# Patient Record
Sex: FEMALE | Race: WHITE | NOT HISPANIC OR LATINO | Employment: FULL TIME | URBAN - NONMETROPOLITAN AREA
[De-identification: names, ages, dates, MRNs, and addresses within clinical notes are randomized per-mention and may not be internally consistent; named-entity substitution may affect disease eponyms.]

---

## 2020-03-07 ENCOUNTER — HOSPITAL ENCOUNTER (EMERGENCY)
Facility: HOSPITAL | Age: 25
Discharge: HOME | End: 2020-03-07
Attending: EMERGENCY MEDICINE | Admitting: EMERGENCY MEDICINE
Payer: COMMERCIAL

## 2020-03-07 VITALS
HEIGHT: 65 IN | DIASTOLIC BLOOD PRESSURE: 69 MMHG | SYSTOLIC BLOOD PRESSURE: 114 MMHG | BODY MASS INDEX: 29.83 KG/M2 | HEART RATE: 83 BPM | OXYGEN SATURATION: 98 % | RESPIRATION RATE: 16 BRPM | TEMPERATURE: 96.7 F | WEIGHT: 179.01 LBS

## 2020-03-07 DIAGNOSIS — R10.9 ABDOMINAL PAIN: Primary | ICD-10-CM

## 2020-03-07 LAB
ALBUMIN SERPL BCP-MCNC: 4 G/DL (ref 3.5–5)
ALP SERPL-CCNC: 73 U/L (ref 46–116)
ALT SERPL W P-5'-P-CCNC: 27 U/L (ref 12–78)
ANION GAP SERPL CALCULATED.3IONS-SCNC: 7 MMOL/L (ref 4–13)
AST SERPL W P-5'-P-CCNC: 17 U/L (ref 5–45)
B-HCG SERPL-ACNC: <2 MIU/ML
BACTERIA UR QL AUTO: ABNORMAL /HPF
BASOPHILS # BLD AUTO: 0.04 THOUSANDS/ΜL (ref 0–0.1)
BASOPHILS NFR BLD AUTO: 0 % (ref 0–1)
BILIRUB SERPL-MCNC: 0.3 MG/DL (ref 0.2–1)
BILIRUB UR QL STRIP: NEGATIVE
BUN SERPL-MCNC: 12 MG/DL (ref 5–25)
CALCIUM SERPL-MCNC: 8.9 MG/DL (ref 8.3–10.1)
CHLORIDE SERPL-SCNC: 101 MMOL/L (ref 100–108)
CLARITY UR: CLEAR
CO2 SERPL-SCNC: 28 MMOL/L (ref 21–32)
COLOR UR: YELLOW
CREAT SERPL-MCNC: 0.67 MG/DL (ref 0.6–1.3)
EOSINOPHIL # BLD AUTO: 0.04 THOUSAND/ΜL (ref 0–0.61)
EOSINOPHIL NFR BLD AUTO: 0 % (ref 0–6)
ERYTHROCYTE [DISTWIDTH] IN BLOOD BY AUTOMATED COUNT: 12.1 % (ref 11.6–15.1)
EXT PREG TEST URINE: NEGATIVE
EXT. CONTROL ED NAV: NORMAL
GFR SERPL CREATININE-BSD FRML MDRD: 123 ML/MIN/1.73SQ M
GLUCOSE SERPL-MCNC: 83 MG/DL (ref 65–140)
GLUCOSE UR STRIP-MCNC: NEGATIVE MG/DL
HCT VFR BLD AUTO: 41 % (ref 34.8–46.1)
HGB BLD-MCNC: 14 G/DL (ref 11.5–15.4)
HGB UR QL STRIP.AUTO: ABNORMAL
IMM GRANULOCYTES # BLD AUTO: 0.04 THOUSAND/UL (ref 0–0.2)
IMM GRANULOCYTES NFR BLD AUTO: 0 % (ref 0–2)
KETONES UR STRIP-MCNC: NEGATIVE MG/DL
LEUKOCYTE ESTERASE UR QL STRIP: NEGATIVE
LIPASE SERPL-CCNC: 120 U/L (ref 73–393)
LYMPHOCYTES # BLD AUTO: 3.22 THOUSANDS/ΜL (ref 0.6–4.47)
LYMPHOCYTES NFR BLD AUTO: 29 % (ref 14–44)
MCH RBC QN AUTO: 32.9 PG (ref 26.8–34.3)
MCHC RBC AUTO-ENTMCNC: 34.1 G/DL (ref 31.4–37.4)
MCV RBC AUTO: 97 FL (ref 82–98)
MONOCYTES # BLD AUTO: 0.75 THOUSAND/ΜL (ref 0.17–1.22)
MONOCYTES NFR BLD AUTO: 7 % (ref 4–12)
NEUTROPHILS # BLD AUTO: 6.91 THOUSANDS/ΜL (ref 1.85–7.62)
NEUTS SEG NFR BLD AUTO: 64 % (ref 43–75)
NITRITE UR QL STRIP: NEGATIVE
NON-SQ EPI CELLS URNS QL MICRO: ABNORMAL /HPF
NRBC BLD AUTO-RTO: 0 /100 WBCS
PH UR STRIP.AUTO: 7.5 [PH]
PLATELET # BLD AUTO: 335 THOUSANDS/UL (ref 149–390)
PMV BLD AUTO: 9 FL (ref 8.9–12.7)
POTASSIUM SERPL-SCNC: 3.4 MMOL/L (ref 3.5–5.3)
PROT SERPL-MCNC: 7.9 G/DL (ref 6.4–8.2)
PROT UR STRIP-MCNC: NEGATIVE MG/DL
RBC # BLD AUTO: 4.25 MILLION/UL (ref 3.81–5.12)
RBC #/AREA URNS AUTO: ABNORMAL /HPF
SODIUM SERPL-SCNC: 136 MMOL/L (ref 136–145)
SP GR UR STRIP.AUTO: 1.01 (ref 1–1.03)
UROBILINOGEN UR QL STRIP.AUTO: 0.2 E.U./DL
WBC # BLD AUTO: 11 THOUSAND/UL (ref 4.31–10.16)
WBC #/AREA URNS AUTO: ABNORMAL /HPF

## 2020-03-07 PROCEDURE — 81025 URINE PREGNANCY TEST: CPT | Performed by: EMERGENCY MEDICINE

## 2020-03-07 PROCEDURE — 99284 EMERGENCY DEPT VISIT MOD MDM: CPT

## 2020-03-07 PROCEDURE — 83690 ASSAY OF LIPASE: CPT | Performed by: EMERGENCY MEDICINE

## 2020-03-07 PROCEDURE — 96374 THER/PROPH/DIAG INJ IV PUSH: CPT

## 2020-03-07 PROCEDURE — 81001 URINALYSIS AUTO W/SCOPE: CPT | Performed by: EMERGENCY MEDICINE

## 2020-03-07 PROCEDURE — 87086 URINE CULTURE/COLONY COUNT: CPT | Performed by: EMERGENCY MEDICINE

## 2020-03-07 PROCEDURE — 96361 HYDRATE IV INFUSION ADD-ON: CPT

## 2020-03-07 PROCEDURE — 85025 COMPLETE CBC W/AUTO DIFF WBC: CPT | Performed by: EMERGENCY MEDICINE

## 2020-03-07 PROCEDURE — 80053 COMPREHEN METABOLIC PANEL: CPT | Performed by: EMERGENCY MEDICINE

## 2020-03-07 PROCEDURE — 99284 EMERGENCY DEPT VISIT MOD MDM: CPT | Performed by: EMERGENCY MEDICINE

## 2020-03-07 PROCEDURE — 87491 CHLMYD TRACH DNA AMP PROBE: CPT | Performed by: EMERGENCY MEDICINE

## 2020-03-07 PROCEDURE — 87591 N.GONORRHOEAE DNA AMP PROB: CPT | Performed by: EMERGENCY MEDICINE

## 2020-03-07 PROCEDURE — 84702 CHORIONIC GONADOTROPIN TEST: CPT

## 2020-03-07 RX ORDER — KETOROLAC TROMETHAMINE 30 MG/ML
15 INJECTION, SOLUTION INTRAMUSCULAR; INTRAVENOUS ONCE
Status: COMPLETED | OUTPATIENT
Start: 2020-03-07 | End: 2020-03-07

## 2020-03-07 RX ADMIN — KETOROLAC TROMETHAMINE 15 MG: 30 INJECTION, SOLUTION INTRAMUSCULAR at 18:00

## 2020-03-07 RX ADMIN — SODIUM CHLORIDE 1000 ML: 0.9 INJECTION, SOLUTION INTRAVENOUS at 17:55

## 2020-03-07 NOTE — ED PROVIDER NOTES
History  Chief Complaint   Patient presents with    Abdominal Pain     abdominal pain in lower abdomen into back in the kidney amairani  has a IUD concerned it maybe that, did have placement checked in january  Several hours of suprapubic discomfort  Radiates to the back  Cramping in nature  No vaginal bleeding no vaginal discharge  Some nausea no vomiting no fever no chills no dysuria hematuria frequency  Bowel habits have been slightly stringy but there is no diarrhea  Prior to Admission Medications   Prescriptions Last Dose Informant Patient Reported? Taking?   levonorgestrel (KYLEENA) 19 5 MG intrauterine device   Yes No   Si each by Intrauterine route      Facility-Administered Medications: None       Past Medical History:   Diagnosis Date    Asthma        Past Surgical History:   Procedure Laterality Date    TONSILLECTOMY         History reviewed  No pertinent family history  I have reviewed and agree with the history as documented  E-Cigarette/Vaping    E-Cigarette Use Former User     Quit Date 19      E-Cigarette/Vaping Substances    Nicotine No     THC No     CBD No     Flavoring No     Other No     Unknown No      Social History     Tobacco Use    Smoking status: Current Every Day Smoker     Packs/day: 0 25     Types: Cigarettes    Smokeless tobacco: Never Used   Substance Use Topics    Alcohol use: Yes     Comment: socially     Drug use: Never       Review of Systems   Constitutional: Negative for fever  HENT: Negative for rhinorrhea  Eyes: Negative for visual disturbance  Respiratory: Negative for shortness of breath  Cardiovascular: Negative for chest pain  Gastrointestinal: Positive for abdominal pain and nausea  Negative for diarrhea and vomiting  Endocrine: Negative for polydipsia  Genitourinary: Negative for dysuria, frequency and hematuria  Musculoskeletal: Negative for neck stiffness  Skin: Negative for rash     Allergic/Immunologic: Negative for immunocompromised state  Neurological: Negative for speech difficulty, weakness and numbness  Physical Exam  Physical Exam   Constitutional: She is oriented to person, place, and time  She appears well-nourished  No distress  HENT:   Head: Normocephalic and atraumatic  Eyes: Conjunctivae and EOM are normal    Neck: Normal range of motion  Neck supple  Cardiovascular: Regular rhythm and normal heart sounds  Pulmonary/Chest: Effort normal and breath sounds normal    Abdominal: Soft  Bowel sounds are normal  She exhibits no mass  There is no tenderness  There is no guarding  Musculoskeletal: She exhibits no edema  Neurological: She is alert and oriented to person, place, and time  Skin: Skin is warm and dry  Psychiatric: She has a normal mood and affect         Vital Signs  ED Triage Vitals [03/07/20 1650]   Temperature Pulse Respirations Blood Pressure SpO2   (!) 96 7 °F (35 9 °C) 75 18 149/61 99 %      Temp Source Heart Rate Source Patient Position - Orthostatic VS BP Location FiO2 (%)   Temporal Monitor Sitting Right arm --      Pain Score       5           Vitals:    03/07/20 1700 03/07/20 1715 03/07/20 1745 03/07/20 1815   BP: 149/61 114/57 101/74 114/69   Pulse: 67 94 84 83   Patient Position - Orthostatic VS: Sitting Sitting Lying Sitting         Visual Acuity      ED Medications  Medications   sodium chloride 0 9 % bolus 1,000 mL (0 mL Intravenous Stopped 3/7/20 1836)   ketorolac (TORADOL) injection 15 mg (15 mg Intravenous Given 3/7/20 1800)       Diagnostic Studies  Results Reviewed     Procedure Component Value Units Date/Time    UA w Reflex to Microscopic w Reflex to Culture [[id number]]  (Abnormal) Collected:  03/07/20 1839    Lab Status:  Final result Specimen:  Urine, Clean Catch Updated:  03/07/20 1848     Color, UA Yellow     Clarity, UA Clear     Specific Gravity, UA 1 010     pH, UA 7 5     Leukocytes, UA Negative     Nitrite, UA Negative     Protein, UA Negative mg/dl      Glucose, UA Negative mg/dl      Ketones, UA Negative mg/dl      Urobilinogen, UA 0 2 E U /dl      Bilirubin, UA Negative     Blood, UA Small    Urine Microscopic [[id number]] Collected:  03/07/20 1839    Lab Status: In process Specimen:  Urine, Clean Catch Updated:  03/07/20 1848    Chlamydia/GC amplified DNA by PCR [[id number]] Collected:  03/07/20 1839    Lab Status: In process Specimen:  Urine, Other Updated:  03/07/20 1843    Urine culture [[id number]] Collected:  03/07/20 1839    Lab Status:   In process Specimen:  Urine, Clean Catch Updated:  03/07/20 1843    POCT pregnancy, urine [[id number]]  (Normal) Resulted:  03/07/20 1835    Lab Status:  Final result Updated:  03/07/20 1836     EXT PREG TEST UR (Ref: Negative) negative     Control valid    hCG, quantitative [[id number]]  (Normal) Collected:  03/07/20 1755    Lab Status:  Final result Specimen:  Blood from Arm, Left Updated:  03/07/20 1821     HCG, Quant <2 mIU/mL     Narrative:        Expected Ranges:     Approximate               Approximate HCG  Gestation age          Concentration ( mIU/mL)  _____________          ______________________   Jose Breen                      HCG values  0 2-1                       5-50  1-2                           2-3                         100-5000  3-4                         500-78469  4-5                         1000-76132  5-6                         [id number]  6-8                         40020-346964  8-12                        27108-096856      Comprehensive metabolic panel [[id number]]  (Abnormal) Collected:  03/07/20 1755    Lab Status:  Final result Specimen:  Blood from Arm, Left Updated:  03/07/20 1817     Sodium 136 mmol/L      Potassium 3 4 mmol/L      Chloride 101 mmol/L      CO2 28 mmol/L      ANION GAP 7 mmol/L      BUN 12 mg/dL      Creatinine 0 67 mg/dL      Glucose 83 mg/dL      Calcium 8 9 mg/dL      AST 17 U/L      ALT 27 U/L      Alkaline Phosphatase 73 U/L      Total Protein 7 9 g/dL Albumin 4 0 g/dL      Total Bilirubin 0 30 mg/dL      eGFR 123 ml/min/1 73sq m     Narrative:       National Kid[employer] Foundation guidelines for Chronic Kidney Disease (CKD):     Stage 1 with normal or high GFR (GFR > 90 mL/min/1 73 square meters)    Stage 2 Mild CKD (GFR = 60-89 mL/min/1 73 square meters)    Stage 3A Moderate CKD (GFR = 45-59 mL/min/1 73 square meters)    Stage 3B Moderate CKD (GFR = 30-44 mL/min/1 73 square meters)    Stage 4 Severe CKD (GFR = 15-29 mL/min/1 73 square meters)    Stage 5 End Stage CKD (GFR <15 mL/min/1 73 square meters)  Note: GFR calculation is accurate only with a steady state creatinine    Lipase [[id number]]  (Normal) Collected:  03/07/20 1755    Lab Status:  Final result Specimen:  Blood from Arm, Left Updated:  03/07/20 1810     Lipase 120 u/L     CBC and differential [[id number]]  (Abnormal) Collected:  03/07/20 1755    Lab Status:  Final result Specimen:  Blood from Arm, Left Updated:  03/07/20 1801     WBC 11 00 Thousand/uL      RBC 4 25 Million/uL      Hemoglobin 14 0 g/dL      Hematocrit 41 0 %      MCV 97 fL      MCH 32 9 pg      MCHC 34 1 g/dL      RDW 12 1 %      MPV 9 0 fL      Platelets 428 Thousands/uL      nRBC 0 /100 WBCs      Neutrophils Relative 64 %      Immat GRANS % 0 %      Lymphocytes Relative 29 %      Monocytes Relative 7 %      Eosinophils Relative 0 %      Basophils Relative 0 %      Neutrophils Absolute 6 91 Thousands/µL      Immature Grans Absolute 0 04 Thousand/uL      Lymphocytes Absolute 3 22 Thousands/µL      Monocytes Absolute 0 75 Thousand/µL      Eosinophils Absolute 0 04 Thousand/µL      Basophils Absolute 0 04 Thousands/µL                  No orders to display              Procedures  Procedures         ED Course                               MDM  Number of Diagnoses or Management Options  Abdominal pain:   Diagnosis management comments: Suprapubic discomfort benign exam will check urine pregnancy test give IV fluids and re-evaluate rather than go directly to imaging      1857: pt says pain has almost completely resolved  abdo completely nontender  Discharging but gave precautions to return for any recurrent/worse/new symp  Disposition  Final diagnoses:   Abdominal pain     Time reflects when diagnosis was documented in both MDM as applicable and the Disposition within this note     Time User Action Codes Description Comment    3/7/2020  6:56 PM Yaya Clancy Add [R10 9] Abdominal pain       ED Disposition     ED Disposition Condition Date/Time Comment    Discharge Stable Sat Mar 7, 2020  6:56 PM Derik Morrell discharge to home/self care  Follow-up Information     Follow up With Specialties Details Why Contact Info    Primary Care Provider - see in next 48 hours  Go in 2 days As needed, If symptoms worsen or new symptoms develop           Patient's Medications   Discharge Prescriptions    No medications on file     No discharge procedures on file      PDMP Review     None          ED Provider  Electronically Signed by           Anitha Mendoza MD  03/07/20 8302

## 2020-03-09 LAB
BACTERIA UR CULT: NORMAL
C TRACH DNA SPEC QL NAA+PROBE: NEGATIVE
N GONORRHOEA DNA SPEC QL NAA+PROBE: NEGATIVE

## 2020-07-28 ENCOUNTER — OFFICE VISIT (OUTPATIENT)
Dept: URGENT CARE | Facility: CLINIC | Age: 25
End: 2020-07-28
Payer: COMMERCIAL

## 2020-07-28 VITALS
HEIGHT: 65 IN | TEMPERATURE: 97.8 F | WEIGHT: 177 LBS | BODY MASS INDEX: 29.49 KG/M2 | HEART RATE: 66 BPM | SYSTOLIC BLOOD PRESSURE: 108 MMHG | OXYGEN SATURATION: 97 % | RESPIRATION RATE: 16 BRPM | DIASTOLIC BLOOD PRESSURE: 51 MMHG

## 2020-07-28 DIAGNOSIS — R19.8 ABNORMAL BOWEL HABITS: Primary | ICD-10-CM

## 2020-07-28 PROCEDURE — G0381 LEV 2 HOSP TYPE B ED VISIT: HCPCS | Performed by: PHYSICIAN ASSISTANT

## 2020-07-28 PROCEDURE — 99202 OFFICE O/P NEW SF 15 MIN: CPT | Performed by: PHYSICIAN ASSISTANT

## 2020-07-28 PROCEDURE — 99282 EMERGENCY DEPT VISIT SF MDM: CPT | Performed by: PHYSICIAN ASSISTANT

## 2020-07-28 NOTE — PROGRESS NOTES
[facility]        NAME: Talat Tidwell is a 25 y o  female  : 1995    MRN: [id number]  DATE: 2020  TIME: 3:43 PM    Assessment and Plan   Abnormal bowel habits [R19 8]  1  Abnormal bowel habits           Patient Instructions       Follow up with PCP in 3-5 days  Proceed to  ER if symptoms worsen  Chief Complaint     Chief Complaint   Patient presents with    worms in stool     c/o gray/white worms in stool first noticed approx one week ago, boyfriend had same  History of Present Illness       Patient states she believes she seen worms in her stool  Friends she lives with have similar sxs  Denies abd pain, diarrhea, blood in stool  Does have occasional anal pruritis  Review of Systems   Review of Systems   Constitutional: Negative for chills and fever  Gastrointestinal: Negative for abdominal distention, abdominal pain, anal bleeding, blood in stool, constipation, diarrhea, nausea, rectal pain and vomiting  Current Medications       Current Outpatient Medications:     levonorgestrel (KYLEENA) 19 5 MG intrauterine device, 1 each by Intrauterine route, Disp: , Rfl:     Current Allergies     Allergies as of 2020    (No Known Allergies)            The following portions of the patient's history were reviewed and updated as appropriate: allergies, current medications, past family history, past medical history, past social history, past surgical history and problem list      Past Medical History:   Diagnosis Date    Asthma        Past Surgical History:   Procedure Laterality Date    TONSILLECTOMY         History reviewed  No pertinent family history  Medications have been verified          Objective   /51 (BP Location: Left arm, Patient Position: Sitting, Cuff Size: Large)   Pulse 66   Temp 97 8 °F (36 6 °C) (Temporal)   Resp 16   Ht 5' 5" (1 651 m)   Wt 80 3 kg (177 lb)   SpO2 97%   BMI 29 45 kg/m²        Physical Exam     Physical Exam Constitutional: She appears well-developed and well-nourished  HENT:   Head: Normocephalic and atraumatic  Cardiovascular: Normal rate and regular rhythm  Pulmonary/Chest: Effort normal    Neurological: She is alert  Skin: Skin is warm  Psychiatric: She has a normal mood and affect  Nursing note and vitals reviewed

## 2020-08-31 ENCOUNTER — HOSPITAL ENCOUNTER (EMERGENCY)
Facility: HOSPITAL | Age: 25
Discharge: HOME | End: 2020-09-01
Attending: EMERGENCY MEDICINE | Admitting: EMERGENCY MEDICINE
Payer: COMMERCIAL

## 2020-08-31 VITALS
HEART RATE: 107 BPM | SYSTOLIC BLOOD PRESSURE: 138 MMHG | DIASTOLIC BLOOD PRESSURE: 70 MMHG | HEIGHT: 65 IN | TEMPERATURE: 97.8 F | OXYGEN SATURATION: 99 % | WEIGHT: 177.69 LBS | BODY MASS INDEX: 29.61 KG/M2 | RESPIRATION RATE: 16 BRPM

## 2020-08-31 DIAGNOSIS — S61.211A LACERATION OF LEFT INDEX FINGER WITHOUT FOREIGN BODY WITHOUT DAMAGE TO NAIL, INITIAL ENCOUNTER: Primary | ICD-10-CM

## 2020-08-31 PROCEDURE — 90715 TDAP VACCINE 7 YRS/> IM: CPT

## 2020-08-31 PROCEDURE — 99282 EMERGENCY DEPT VISIT SF MDM: CPT

## 2020-08-31 PROCEDURE — 90471 IMMUNIZATION ADMIN: CPT

## 2020-08-31 PROCEDURE — 12001 RPR S/N/AX/GEN/TRNK 2.5CM/<: CPT | Performed by: EMERGENCY MEDICINE

## 2020-08-31 PROCEDURE — 99282 EMERGENCY DEPT VISIT SF MDM: CPT | Performed by: EMERGENCY MEDICINE

## 2020-08-31 RX ORDER — LIDOCAINE HYDROCHLORIDE 10 MG/ML
5 INJECTION, SOLUTION EPIDURAL; INFILTRATION; INTRACAUDAL; PERINEURAL ONCE
Status: DISCONTINUED | OUTPATIENT
Start: 2020-08-31 | End: 2020-08-31

## 2020-08-31 RX ADMIN — TETANUS TOXOID, REDUCED DIPHTHERIA TOXOID AND ACELLULAR PERTUSSIS VACCINE, ADSORBED 0.5 ML: 5; 2.5; 8; 8; 2.5 SUSPENSION INTRAMUSCULAR at 23:22

## 2020-09-01 NOTE — ED PROVIDER NOTES
History  Chief Complaint   Patient presents with    Finger Laceration     cut left index finger on a kitchen knife approximatly 20 minutes ago  27-year-old right-hand-dominant female presenting with laceration to her left index finger  Patient reports that she was attempting to open a package with a knife when she slipped and cut the tip of her left index finger  Bleeding is mostly controlled  No other complaints on review of systems  Patient is not aware of her last tetanus immunization  Prior to Admission Medications   Prescriptions Last Dose Informant Patient Reported? Taking?   levonorgestrel (KYLEENA) 19 5 MG intrauterine device   Yes No   Si each by Intrauterine route      Facility-Administered Medications: None       Past Medical History:   Diagnosis Date    Asthma        Past Surgical History:   Procedure Laterality Date    TONSILLECTOMY         History reviewed  No pertinent family history  I have reviewed and agree with the history as documented  E-Cigarette/Vaping    E-Cigarette Use Former User     Quit Date 19      E-Cigarette/Vaping Substances    Nicotine No     THC No     CBD No     Flavoring No     Other No     Unknown No      Social History     Tobacco Use    Smoking status: Current Every Day Smoker     Packs/day: 0 50     Types: Cigarettes    Smokeless tobacco: Never Used   Substance Use Topics    Alcohol use: Yes     Comment: socially     Drug use: Never       Review of Systems   Constitutional: Negative for diaphoresis, fever and unexpected weight change  HENT: Negative for congestion, rhinorrhea and sore throat  Eyes: Negative for pain, discharge and visual disturbance  Respiratory: Negative for cough, shortness of breath and wheezing  Cardiovascular: Negative for chest pain, palpitations and leg swelling  Gastrointestinal: Negative for abdominal pain, blood in stool, constipation, diarrhea, nausea and vomiting     Genitourinary: Negative for dysuria, flank pain and hematuria  Musculoskeletal: Negative for arthralgias and joint swelling  Skin: Positive for wound (laceration, left index finger)  Negative for rash  Allergic/Immunologic: Negative for environmental allergies and food allergies  Neurological: Negative for dizziness, seizures, weakness and numbness  Hematological: Negative for adenopathy  Psychiatric/Behavioral: Negative for confusion and hallucinations  Physical Exam  Physical Exam  Vitals signs and nursing note reviewed  Constitutional:       General: She is not in acute distress  Appearance: She is well-developed  HENT:      Head: Normocephalic and atraumatic  Right Ear: External ear normal       Left Ear: External ear normal    Eyes:      Conjunctiva/sclera: Conjunctivae normal       Pupils: Pupils are equal, round, and reactive to light  Musculoskeletal: Normal range of motion  General: No deformity  Skin:     General: Skin is warm and dry  Comments: Laceration to tip left index finger  Laceration is relatively superficial with a small, relatively thin flap of skin  Bleeding controlled  Neurological:      Mental Status: She is alert and oriented to person, place, and time  Comments: No gross motor deficits noted  Cranial nerves II-XII are intact  Speech is fluent without dysarthria or aphasia     Psychiatric:         Mood and Affect: Mood normal          Behavior: Behavior normal          Vital Signs  ED Triage Vitals [08/31/20 2313]   Temperature Pulse Respirations Blood Pressure SpO2   97 8 °F (36 6 °C) (!) 107 16 138/70 99 %      Temp Source Heart Rate Source Patient Position - Orthostatic VS BP Location FiO2 (%)   Temporal Monitor Sitting Right arm --      Pain Score       --           Vitals:    08/31/20 2313   BP: 138/70   Pulse: (!) 107   Patient Position - Orthostatic VS: Sitting         Visual Acuity      ED Medications  Medications   tetanus-diphtheria-acellular pertussis (BOOSTRIX) IM injection 0 5 mL (0 5 mL Intramuscular Given 8/31/20 3594)       Diagnostic Studies  Results Reviewed     None                 No orders to display              Procedures  Laceration repair    Date/Time: 9/1/2020 12:08 AM  Performed by: Ajay Rolon MD  Authorized by: Ajay Rolon MD   Consent: Verbal consent obtained  Risks and benefits: risks, benefits and alternatives were discussed  Consent given by: patient  Patient understanding: patient states understanding of the procedure being performed  Patient consent: the patient's understanding of the procedure matches consent given  Patient identity confirmed: verbally with patient and arm band  Body area: upper extremity  Location details: left index finger  Laceration length: 0 5 cm  Tendon involvement: none  Nerve involvement: none  Vascular damage: no      Procedure Details:  Preparation: Patient was prepped and draped in the usual sterile fashion  Irrigation solution: saline  Irrigation method: jet lavage  Amount of cleaning: standard  Skin closure: Steri-Strips and glue  Technique: simple  Approximation: close  Approximation difficulty: simple  Patient tolerance: Patient tolerated the procedure well with no immediate complications               ED Course       US AUDIT      Most Recent Value   Initial Alcohol Screen: US AUDIT-C    1  How often do you have a drink containing alcohol? 1 Filed at: 08/31/2020 2314   2  How many drinks containing alcohol do you have on a typical day you are drinking? 1 Filed at: 08/31/2020 2314   3a  Male UNDER 65: How often do you have five or more drinks on one occasion? 0 Filed at: 08/31/2020 2314   3b  FEMALE Any Age, or MALE 65+: How often do you have 4 or more drinks on one occassion? 0 Filed at: 08/31/2020 2314   Audit-C Score  2 Filed at: 08/31/2020 2314                  CARMINE/DAST-10      Most Recent Value   How many times in the past year have you       Used an illegal drug or used a prescription medication for non-medical reasons? Never Filed at: 08/31/2020 2314                                MDM  Number of Diagnoses or Management Options  Laceration of left index finger without foreign body without damage to nail, initial encounter: minor  Diagnosis management comments:     Patient presented with a laceration to her left index finger as detailed above  Laceration was relatively superficial   There was a small flap of skin  This laceration would not be amenable to suture repair  Laceration was repaired with Steri-Strips and glue  Patient tolerated procedure well  Tetanus was updated  Patient was given wound care instructions  She was advised to follow up with her PCP as needed  Patient verbalized understanding  Amount and/or Complexity of Data Reviewed  Decide to obtain previous medical records or to obtain history from someone other than the patient: yes  Review and summarize past medical records: yes    Risk of Complications, Morbidity, and/or Mortality  Presenting problems: minimal  Diagnostic procedures: minimal  Management options: minimal    Patient Progress  Patient progress: stable        Disposition  Final diagnoses:   Laceration of left index finger without foreign body without damage to nail, initial encounter     Time reflects when diagnosis was documented in both MDM as applicable and the Disposition within this note     Time User Action Codes Description Comment    8/31/2020 11:47 PM Chantale Nelson Add [[id number]] Laceration of left index finger without foreign body without damage to nail, initial encounter       ED Disposition     ED Disposition Condition Date/Time Comment    Discharge Good Mon Aug 31, 2020 11:47 PM Johnny Palafox discharge to home/self care  Follow-up Information     Follow up With Specialties Details Why Contact Info Additional Information    Dorothy Duran Nurse Practitioner Call  As needed   [address]  [place] [address]  [phone]       [facility], [place], operated by [facility] Emergency Department Emergency Medicine Go to  If symptoms worsen  [address] [id number] [phone] [place] ED, [facility] [address], [place], [place], [address]          Discharge Medication List as of 9/1/2020 12:05 AM      CONTINUE these medications which have NOT CHANGED    Details   levonorgestrel (Alem Holiday) 19 5 MG intrauterine device 1 each by Intrauterine route, Historical Med           No discharge procedures on file      PDMP Review     None          ED Provider  Electronically Signed by           Renetta Herman MD  09/01/20 9989

## 2020-09-01 NOTE — DISCHARGE INSTRUCTIONS
Your laceration was repaired with Steri-Strips and skin glue  The skin glue will dissolve on its own and the Steri-Strips will fall off on their own  You may shower as normal       Return to the ER with redness spreading up the finger into the hand, unexplained fever or chills, pus draining from the laceration, finger swelling, if the wound comes apart,  or any other concerning symptoms

## 2020-12-09 ENCOUNTER — APPOINTMENT (OUTPATIENT)
Dept: RADIOLOGY | Facility: CLINIC | Age: 25
End: 2020-12-09
Payer: COMMERCIAL

## 2020-12-09 ENCOUNTER — OFFICE VISIT (OUTPATIENT)
Dept: INTERNAL MEDICINE CLINIC | Facility: CLINIC | Age: 25
End: 2020-12-09
Payer: COMMERCIAL

## 2020-12-09 ENCOUNTER — LAB (OUTPATIENT)
Dept: LAB | Facility: CLINIC | Age: 25
End: 2020-12-09
Payer: COMMERCIAL

## 2020-12-09 ENCOUNTER — TELEPHONE (OUTPATIENT)
Dept: ADMINISTRATIVE | Facility: OTHER | Age: 25
End: 2020-12-09

## 2020-12-09 VITALS
RESPIRATION RATE: 14 BRPM | HEART RATE: 83 BPM | OXYGEN SATURATION: 99 % | DIASTOLIC BLOOD PRESSURE: 66 MMHG | HEIGHT: 66 IN | TEMPERATURE: 97 F | BODY MASS INDEX: 28.67 KG/M2 | WEIGHT: 178.4 LBS | SYSTOLIC BLOOD PRESSURE: 112 MMHG

## 2020-12-09 DIAGNOSIS — M54.50 CHRONIC MIDLINE LOW BACK PAIN WITHOUT SCIATICA: Primary | ICD-10-CM

## 2020-12-09 DIAGNOSIS — G89.29 CHRONIC MIDLINE LOW BACK PAIN WITHOUT SCIATICA: Primary | ICD-10-CM

## 2020-12-09 DIAGNOSIS — R19.7 DIARRHEA, UNSPECIFIED TYPE: ICD-10-CM

## 2020-12-09 DIAGNOSIS — M54.50 CHRONIC MIDLINE LOW BACK PAIN WITHOUT SCIATICA: ICD-10-CM

## 2020-12-09 DIAGNOSIS — R10.13 DYSPEPSIA: ICD-10-CM

## 2020-12-09 DIAGNOSIS — G89.29 CHRONIC MIDLINE LOW BACK PAIN WITHOUT SCIATICA: ICD-10-CM

## 2020-12-09 LAB
ALBUMIN SERPL BCP-MCNC: 3.9 G/DL (ref 3.5–5)
ALP SERPL-CCNC: 67 U/L (ref 46–116)
ALT SERPL W P-5'-P-CCNC: 23 U/L (ref 12–78)
ANION GAP SERPL CALCULATED.3IONS-SCNC: 3 MMOL/L (ref 4–13)
AST SERPL W P-5'-P-CCNC: 11 U/L (ref 5–45)
BASOPHILS # BLD AUTO: 0.04 THOUSANDS/ΜL (ref 0–0.1)
BASOPHILS NFR BLD AUTO: 0 % (ref 0–1)
BILIRUB SERPL-MCNC: 0.77 MG/DL (ref 0.2–1)
BUN SERPL-MCNC: 10 MG/DL (ref 5–25)
CALCIUM SERPL-MCNC: 9.1 MG/DL (ref 8.3–10.1)
CHLORIDE SERPL-SCNC: 107 MMOL/L (ref 100–108)
CO2 SERPL-SCNC: 28 MMOL/L (ref 21–32)
CREAT SERPL-MCNC: 0.62 MG/DL (ref 0.6–1.3)
CRP SERPL QL: <3 MG/L
EOSINOPHIL # BLD AUTO: 0.05 THOUSAND/ΜL (ref 0–0.61)
EOSINOPHIL NFR BLD AUTO: 1 % (ref 0–6)
ERYTHROCYTE [DISTWIDTH] IN BLOOD BY AUTOMATED COUNT: 12.1 % (ref 11.6–15.1)
GFR SERPL CREATININE-BSD FRML MDRD: 126 ML/MIN/1.73SQ M
GLUCOSE P FAST SERPL-MCNC: 93 MG/DL (ref 65–99)
HCT VFR BLD AUTO: 40.5 % (ref 34.8–46.1)
HGB BLD-MCNC: 13.6 G/DL (ref 11.5–15.4)
IMM GRANULOCYTES # BLD AUTO: 0.02 THOUSAND/UL (ref 0–0.2)
IMM GRANULOCYTES NFR BLD AUTO: 0 % (ref 0–2)
LYMPHOCYTES # BLD AUTO: 3.58 THOUSANDS/ΜL (ref 0.6–4.47)
LYMPHOCYTES NFR BLD AUTO: 36 % (ref 14–44)
MCH RBC QN AUTO: 32.3 PG (ref 26.8–34.3)
MCHC RBC AUTO-ENTMCNC: 33.6 G/DL (ref 31.4–37.4)
MCV RBC AUTO: 96 FL (ref 82–98)
MONOCYTES # BLD AUTO: 0.73 THOUSAND/ΜL (ref 0.17–1.22)
MONOCYTES NFR BLD AUTO: 7 % (ref 4–12)
NEUTROPHILS # BLD AUTO: 5.61 THOUSANDS/ΜL (ref 1.85–7.62)
NEUTS SEG NFR BLD AUTO: 56 % (ref 43–75)
NRBC BLD AUTO-RTO: 0 /100 WBCS
PLATELET # BLD AUTO: 343 THOUSANDS/UL (ref 149–390)
PMV BLD AUTO: 9.5 FL (ref 8.9–12.7)
POTASSIUM SERPL-SCNC: 3.6 MMOL/L (ref 3.5–5.3)
PROT SERPL-MCNC: 7.6 G/DL (ref 6.4–8.2)
RBC # BLD AUTO: 4.21 MILLION/UL (ref 3.81–5.12)
SODIUM SERPL-SCNC: 138 MMOL/L (ref 136–145)
WBC # BLD AUTO: 10.03 THOUSAND/UL (ref 4.31–10.16)

## 2020-12-09 PROCEDURE — 72110 X-RAY EXAM L-2 SPINE 4/>VWS: CPT

## 2020-12-09 PROCEDURE — 3008F BODY MASS INDEX DOCD: CPT | Performed by: INTERNAL MEDICINE

## 2020-12-09 PROCEDURE — 36415 COLL VENOUS BLD VENIPUNCTURE: CPT

## 2020-12-09 PROCEDURE — 86140 C-REACTIVE PROTEIN: CPT

## 2020-12-09 PROCEDURE — 3725F SCREEN DEPRESSION PERFORMED: CPT | Performed by: INTERNAL MEDICINE

## 2020-12-09 PROCEDURE — 99204 OFFICE O/P NEW MOD 45 MIN: CPT | Performed by: INTERNAL MEDICINE

## 2020-12-09 PROCEDURE — 85025 COMPLETE CBC W/AUTO DIFF WBC: CPT

## 2020-12-09 PROCEDURE — 80053 COMPREHEN METABOLIC PANEL: CPT

## 2020-12-09 RX ORDER — OMEPRAZOLE 40 MG/1
40 CAPSULE, DELAYED RELEASE ORAL
Qty: 30 CAPSULE | Refills: 1 | Status: SHIPPED | OUTPATIENT
Start: 2020-12-09

## 2020-12-09 RX ORDER — MELOXICAM 15 MG/1
15 TABLET ORAL DAILY PRN
Qty: 15 TABLET | Refills: 0 | Status: SHIPPED | OUTPATIENT
Start: 2020-12-09 | End: 2020-12-24

## 2021-01-24 ENCOUNTER — TELEPHONE (OUTPATIENT)
Dept: OTHER | Facility: OTHER | Age: 26
End: 2021-01-24

## 2021-01-25 ENCOUNTER — HOSPITAL ENCOUNTER (EMERGENCY)
Facility: HOSPITAL | Age: 26
Discharge: HOME | End: 2021-01-25
Attending: EMERGENCY MEDICINE | Admitting: EMERGENCY MEDICINE
Payer: OTHER MISCELLANEOUS

## 2021-01-25 ENCOUNTER — APPOINTMENT (EMERGENCY)
Dept: CT IMAGING | Facility: HOSPITAL | Age: 26
End: 2021-01-25
Payer: OTHER MISCELLANEOUS

## 2021-01-25 VITALS
OXYGEN SATURATION: 98 % | TEMPERATURE: 97.7 F | BODY MASS INDEX: 28.79 KG/M2 | DIASTOLIC BLOOD PRESSURE: 67 MMHG | SYSTOLIC BLOOD PRESSURE: 117 MMHG | HEART RATE: 102 BPM | WEIGHT: 178.35 LBS | RESPIRATION RATE: 20 BRPM

## 2021-01-25 DIAGNOSIS — S09.90XA INJURY OF HEAD, INITIAL ENCOUNTER: ICD-10-CM
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
## 2021-01-25 LAB
EXT PREG TEST URINE: NEGATIVE
EXT. CONTROL ED NAV: NORMAL

## 2021-01-25 PROCEDURE — 72125 CT NECK SPINE W/O DYE: CPT

## 2021-01-25 PROCEDURE — 99284 EMERGENCY DEPT VISIT MOD MDM: CPT

## 2021-01-25 PROCEDURE — 81025 URINE PREGNANCY TEST: CPT | Performed by: EMERGENCY MEDICINE

## 2021-01-25 PROCEDURE — 99284 EMERGENCY DEPT VISIT MOD MDM: CPT | Performed by: EMERGENCY MEDICINE

## 2021-01-25 PROCEDURE — 70450 CT HEAD/BRAIN W/O DYE: CPT

## 2021-01-25 RX ORDER — ACETAMINOPHEN 325 MG/1
650 TABLET ORAL ONCE
Status: COMPLETED | OUTPATIENT
Start: 2021-01-25 | End: 2021-01-25

## 2021-01-25 RX ADMIN — ACETAMINOPHEN 650 MG: 325 TABLET, FILM COATED ORAL at 21:34

## 2021-01-26 NOTE — ED PROVIDER NOTES
History  Chief Complaint   Patient presents with    Fall     HEAD INJURY     49-year-old female presents with left occipital head strike  Patient was at work and slipped falling backwards striking her head  She denies loss consciousness  She states this occurred at 1900 hours      History provided by:  Patient  Fall  Mechanism of injury: fall    Injury location:  Head/neck  Head/neck injury location:  Head  Incident location:  Work  Fall:     Fall occurred: fall  Impact surface: Tiles at work  Point of impact:  Head  Prior to arrival data:     Bystander interventions:  None    Blood loss:  None    Airway interventions:  None    Breathing interventions:  None    IV access status:  None    IO access:  None    Fluids administered:  None    Cardiac interventions:  None  Associated symptoms: no abdominal pain, no chest pain and no headaches        Prior to Admission Medications   Prescriptions Last Dose Informant Patient Reported? Taking?   levonorgestrel (KYLEENA) 19 5 MG intrauterine device   Yes No   Si each by Intrauterine route   meloxicam (MOBIC) 15 mg tablet   No No   Sig: Take 1 tablet (15 mg total) by mouth daily as needed (Low back pain) for up to 15 days   omeprazole (PriLOSEC) 40 MG capsule   No No   Sig: Take 1 capsule (40 mg total) by mouth daily before breakfast      Facility-Administered Medications: None       Past Medical History:   Diagnosis Date    Asthma        Past Surgical History:   Procedure Laterality Date    TONSILLECTOMY      WISDOM TOOTH EXTRACTION         Family History   Problem Relation Age of Onset    ADD / ADHD Mother     ADD / ADHD Father     Cancer Family      I have reviewed and agree with the history as documented      E-Cigarette/Vaping    E-Cigarette Use Former User     Quit Date 19      E-Cigarette/Vaping Substances    Nicotine Yes     THC No     CBD No     Flavoring Yes     Other No     Unknown No      Social History     Tobacco Use    Smoking status: Current Every Day Smoker     Packs/day: 0 50     Types: Cigarettes    Smokeless tobacco: Never Used   Substance Use Topics    Alcohol use: Yes     Frequency: Monthly or less     Comment: socially     Drug use: Never       Review of Systems   Constitutional: Negative  Negative for activity change, appetite change and chills  HENT: Negative  Negative for congestion, dental problem and drooling  Eyes: Negative  Negative for pain, discharge and itching  Respiratory: Negative  Negative for apnea, choking and chest tightness  Cardiovascular: Negative  Negative for chest pain and leg swelling  Gastrointestinal: Negative  Negative for abdominal distention, abdominal pain and anal bleeding  Endocrine: Negative  Negative for cold intolerance, heat intolerance and polydipsia  Genitourinary: Negative  Negative for difficulty urinating and dyspareunia  Musculoskeletal: Negative  Negative for arthralgias  Skin: Negative  Negative for color change, pallor and rash  Allergic/Immunologic: Negative  Negative for environmental allergies and food allergies  Neurological: Negative  Negative for dizziness, facial asymmetry and headaches  Hematological: Negative  Negative for adenopathy  Psychiatric/Behavioral: Negative  Negative for agitation, behavioral problems and confusion  All other systems reviewed and are negative  Physical Exam  Physical Exam  Vitals signs reviewed  Constitutional:       General: She is not in acute distress  HENT:      Head: Normocephalic  Right Ear: External ear normal       Left Ear: External ear normal       Nose: Nose normal       Mouth/Throat:      Mouth: Mucous membranes are moist       Pharynx: No oropharyngeal exudate  Eyes:      General:         Right eye: No discharge  Left eye: No discharge  Pupils: Pupils are equal, round, and reactive to light  Neck:      Musculoskeletal: Normal range of motion     Cardiovascular: Rate and Rhythm: Normal rate  Pulses: Normal pulses  Heart sounds: No murmur  Pulmonary:      Effort: Pulmonary effort is normal  No respiratory distress  Abdominal:      General: Abdomen is flat  There is no distension  Palpations: There is no mass  Musculoskeletal: Normal range of motion  General: No swelling  Skin:     General: Skin is warm  Capillary Refill: Capillary refill takes less than 2 seconds  Neurological:      General: No focal deficit present  Mental Status: She is alert  Psychiatric:         Mood and Affect: Mood normal          Thought Content: Thought content normal          Vital Signs  ED Triage Vitals [01/25/21 2120]   Temperature Pulse Respirations Blood Pressure SpO2   97 7 °F (36 5 °C) 102 20 117/67 100 %      Temp Source Heart Rate Source Patient Position - Orthostatic VS BP Location FiO2 (%)   Oral Monitor Sitting Right arm --      Pain Score       7           Vitals:    01/25/21 2120   BP: 117/67   Pulse: 102   Patient Position - Orthostatic VS: Sitting         Visual Acuity  Visual Acuity      Most Recent Value   L Pupil Size (mm)  4   R Pupil Size (mm)  4          ED Medications  Medications   acetaminophen (TYLENOL) tablet 650 mg (has no administration in time range)       Diagnostic Studies  Results Reviewed     Procedure Component Value Units Date/Time    POCT pregnancy, urine [[id number]]     Lab Status: No result                  CT head without contrast    (Results Pending)   CT cervical spine without contrast    (Results Pending)              Procedures  Procedures         ED Course                                           MDM  Number of Diagnoses or Management Options  Diagnosis management comments: Differential diagnosis 1  Intracranial hemorrhage 2  Cervical spine fracture 3   Concussion       Disposition  Final diagnoses:   Fall, initial encounter   Injury of head, initial encounter     Time reflects when diagnosis was documented in both MDM as applicable and the Disposition within this note     Time User Action Codes Description Comment    1/25/2021  9:34 PM Kenisha Membreno [[id number]  [id number]] Fall, initial encounter     1/25/2021  9:34 PM Kenisha Membreno [[id number]] Injury of head, initial encounter       ED Disposition     ED Disposition Condition Date/Time Comment    Discharge Stable Mon Jan 25, 2021  9:34 PM [employer] [employer] discharge to home/self care  Follow-up Information    None         Patient's Medications   Discharge Prescriptions    No medications on file     No discharge procedures on file      PDMP Review     None          ED Provider  Electronically Signed by           Chelsey Bass DO  01/27/21 9233

## 2021-06-12 ENCOUNTER — AMB VIDEO VISIT (OUTPATIENT)
Dept: OTHER | Facility: HOSPITAL | Age: 26
End: 2021-06-12

## 2021-06-12 NOTE — CARE ANYWHERE EVISITS
Visit Summary for Darrel Corral - Gender: Female - Date of Birth: [id number]  Date: [id number] - Duration: 9 minutes  Patient: Darrel Corral  Provider: Gallito Marquez    Patient Contact Information  Address  [address] [place]; [address]  [phone]    Visit Topics  Frequent nosebleeds, mild headaches, passing nausea, fatigue [Added By: Self - 2021-06-12]    Triage Questions   What is your current physical address in the event of a medical emergency? Answer []  Are you allergic to any medications? Answer []  Are you now or could you be pregnant? Answer []  Do you have any immune system compromise or chronic lung   disease? Answer []  Do you have any vulnerable family members in the home (infant, pregnant, cancer, elderly)? Answer []     Conversation Transcripts  [0A][0A] [Notification] Elly Hudson, [employer] Staff, will help you prepare for your visit  She is Tayla Hedrick, Family Physician [0A][Michel Perez] Arash, and thank you for connecting  While you are waiting for the doctor, are there   any questions I can answer for you about our service? Please contact customer service if you have questions about billing, insurance, or technical issues  Visits work best with a stable WiFi connection, so please make sure you are connected before we   begin [0A][Notification] Elly Hudson has left the room  [0A][Notification] You are connected with Stevenson Hedrick, Family Physician [0A][Notification] Johnny Palafox is located in [place]  [0A][Notification] Johnny Palafox has shared health   history  Margarito Becerra  [0A]    Diagnosis  Epistaxis    Procedures  Value: [id number] Code: CPT-4 UNLISTED E&M SERVICE    Medications Prescribed    No prescriptions ordered    Provider Notes  [0A][0A] Reason for Consultation: [0A]Patient presents with complaint of  recurrent nosebleeds  Patientâs nose is no longer[0A]bleeding  She denies trauma or HTN    [0A]PMH:  none[0A]Meds:  none[0A]Medication Allergies: NKDA[0A]Exam: Alert, normal   mental[0A]status and interaction, no visible distress, non- toxic appearance  [0A]Assessment:  Epistaxis[0A]Plan:  No longer[0A]bleeding  Reassurance  Use a[0A]small amount of Vaseline to moisturize the nose for a few days  [0A]Use a humidifier in the   home or bedroom with the door closed  Use saline nasal spray for moisture  See an ENT  The above[0A]treatment is necessary per standard of care[0A]Discussed  precautions  [0A]Patient voiced understanding and agrees to plan  [0A]Follow up:[0A]1)       If[0A]you were prescribed medication and there are any questions or problems with the[0A]prescription, call [phone] anytime for assistance  Namrata Powers)     Please[0A]re-connect for another online visit or see an in-person provider should   your[0A]symptoms worsen or persist or see your primary care physician   Papo Ayala)    Please[0A]print a copy of this note and send it to your regular doctor, or take it to[0A]your next visit so it may be included in your medical record   [0A]    Electronically signed by: Modesta Hadley(NPI [id number])

## 2021-07-14 ENCOUNTER — OFFICE VISIT (OUTPATIENT)
Dept: INTERNAL MEDICINE CLINIC | Facility: CLINIC | Age: 26
End: 2021-07-14
Payer: COMMERCIAL

## 2021-07-14 ENCOUNTER — APPOINTMENT (OUTPATIENT)
Dept: LAB | Facility: CLINIC | Age: 26
End: 2021-07-14
Payer: COMMERCIAL

## 2021-07-14 VITALS
HEART RATE: 93 BPM | BODY MASS INDEX: 29.01 KG/M2 | TEMPERATURE: 98.4 F | WEIGHT: 180.5 LBS | OXYGEN SATURATION: 98 % | DIASTOLIC BLOOD PRESSURE: 70 MMHG | SYSTOLIC BLOOD PRESSURE: 108 MMHG | HEIGHT: 66 IN

## 2021-07-14 DIAGNOSIS — R11.0 NAUSEA: ICD-10-CM

## 2021-07-14 DIAGNOSIS — N64.4 BREAST TENDERNESS: ICD-10-CM

## 2021-07-14 DIAGNOSIS — N91.2 AMENORRHEA: Primary | ICD-10-CM

## 2021-07-14 DIAGNOSIS — Z32.01 POSITIVE PREGNANCY TEST: ICD-10-CM

## 2021-07-14 DIAGNOSIS — R60.9 FLUID RETENTION: ICD-10-CM

## 2021-07-14 DIAGNOSIS — N91.2 AMENORRHEA: ICD-10-CM

## 2021-07-14 LAB — B-HCG SERPL-ACNC: ABNORMAL MIU/ML

## 2021-07-14 PROCEDURE — 3008F BODY MASS INDEX DOCD: CPT | Performed by: INTERNAL MEDICINE

## 2021-07-14 PROCEDURE — 1036F TOBACCO NON-USER: CPT | Performed by: INTERNAL MEDICINE

## 2021-07-14 PROCEDURE — 99213 OFFICE O/P EST LOW 20 MIN: CPT | Performed by: INTERNAL MEDICINE

## 2021-07-14 PROCEDURE — 36415 COLL VENOUS BLD VENIPUNCTURE: CPT

## 2021-07-14 PROCEDURE — 3725F SCREEN DEPRESSION PERFORMED: CPT | Performed by: INTERNAL MEDICINE

## 2021-07-14 PROCEDURE — 84702 CHORIONIC GONADOTROPIN TEST: CPT

## 2021-07-14 NOTE — PROGRESS NOTES
Assessment/Plan:  Problem List Items Addressed This Visit     None      Visit Diagnoses     Amenorrhea    -  Primary    Relevant Orders    hCG, quantitative    Ambulatory referral to Obstetrics / Gynecology    Positive pregnancy test        Relevant Orders    hCG, quantitative    Ambulatory referral to Obstetrics / Gynecology    Nausea        Relevant Orders    hCG, quantitative    Ambulatory referral to Obstetrics / Gynecology    Fluid retention        Relevant Orders    hCG, quantitative    Ambulatory referral to Obstetrics / Gynecology    Breast tenderness               Diagnoses and all orders for this visit:    Amenorrhea  -     hCG, quantitative; Future  -     Ambulatory referral to Obstetrics / Gynecology; Future    Positive pregnancy test  -     hCG, quantitative; Future  -     Ambulatory referral to Obstetrics / Gynecology; Future    Nausea  -     hCG, quantitative; Future  -     Ambulatory referral to Obstetrics / Gynecology; Future    Fluid retention  -     hCG, quantitative; Future  -     Ambulatory referral to Obstetrics / Gynecology; Future    Breast tenderness        No problem-specific Assessment & Plan notes found for this encounter  A/P: Doing ok and will check a quantitative HCG  Refer to GYN  Hold on prenatal vitamin until labs back  Discussed watching diet and getting sleep  Discussed avoiding smoking, ETOH, drugs(OTC as well), etc  RTC as scheduled  Emotionally appears to be dealing with the situation well  Subjective:      Patient ID: Armida Sadler is a 22 y o  female  WF, never been pregnant, presents for several months missed  Menses  Pt was on an IUD and had it taken out around 2/21  Has since has unprotected sex and has missed her menses over the past 2-3 months  Recently noted some nausea, fluid retention, and increase in abdominal girth  Breast tenderness as well  Took two home pregnancy test and both were positive  Plans on keeping the child if pregnant  Denies any depression  The following portions of the patient's history were reviewed and updated as appropriate:   She has a past medical history of Asthma ,  does not have a problem list on file  ,   has a past surgical history that includes Tonsillectomy and Tram tooth extraction  ,  family history includes ADD / ADHD in her father and mother; Cancer in her family  ,   reports that she quit smoking 12 days ago  Her smoking use included cigarettes  She smoked 0 50 packs per day  She has never used smokeless tobacco  She reports previous alcohol use  She reports that she does not use drugs  ,  has No Known Allergies     Current Outpatient Medications   Medication Sig Dispense Refill    levonorgestrel (KYLEENA) 19 5 MG intrauterine device 1 each by Intrauterine route (Patient not taking: Reported on 7/14/2021)      meloxicam (MOBIC) 15 mg tablet Take 1 tablet (15 mg total) by mouth daily as needed (Low back pain) for up to 15 days 15 tablet 0    omeprazole (PriLOSEC) 40 MG capsule Take 1 capsule (40 mg total) by mouth daily before breakfast (Patient not taking: Reported on 7/14/2021) 30 capsule 1     No current facility-administered medications for this visit  Review of Systems   Constitutional: Negative for activity change, chills, diaphoresis, fatigue and fever  Respiratory: Negative for cough, chest tightness, shortness of breath and wheezing  Cardiovascular: Negative for chest pain, palpitations and leg swelling  Gastrointestinal: Positive for nausea  Negative for abdominal pain, constipation, diarrhea and vomiting  Genitourinary: Negative for difficulty urinating, dysuria and frequency  Musculoskeletal: Negative for arthralgias, gait problem and myalgias  Breast tenderness  Neurological: Negative for dizziness, seizures, syncope, weakness, light-headedness and headaches  Psychiatric/Behavioral: Negative for confusion  The patient is not nervous/anxious          PHQ-9 Depression Screening    PHQ-9: Frequency of the following problems over the past two weeks:      Little interest or pleasure in doing things: 1 - several days  Feeling down, depressed, or hopeless: 1 - several days  PHQ-2 Score: 2        Objective:  Vitals:    07/14/21 1422   BP: 108/70   Pulse: 93   Temp: 98 4 °F (36 9 °C)   SpO2: 98%   Weight: 81 9 kg (180 lb 8 oz)   Height: 5' 6" (1 676 m)     Body mass index is 29 13 kg/m²       Physical Exam

## 2022-12-12 ENCOUNTER — VBI (OUTPATIENT)
Dept: ADMINISTRATIVE | Facility: OTHER | Age: 27
End: 2022-12-12

## 2023-06-07 ENCOUNTER — VBI (OUTPATIENT)
Dept: ADMINISTRATIVE | Facility: OTHER | Age: 28
End: 2023-06-07

## 2024-07-28 ENCOUNTER — OFFICE VISIT (OUTPATIENT)
Dept: URGENT CARE | Facility: CLINIC | Age: 29
End: 2024-07-28
Payer: COMMERCIAL

## 2024-07-28 VITALS
HEART RATE: 93 BPM | RESPIRATION RATE: 18 BRPM | DIASTOLIC BLOOD PRESSURE: 60 MMHG | TEMPERATURE: 98.2 F | OXYGEN SATURATION: 99 % | SYSTOLIC BLOOD PRESSURE: 128 MMHG

## 2024-07-28 DIAGNOSIS — N76.0 ACUTE VAGINITIS: Primary | ICD-10-CM

## 2024-07-28 PROCEDURE — 99213 OFFICE O/P EST LOW 20 MIN: CPT

## 2024-07-28 RX ORDER — CLOTRIMAZOLE AND BETAMETHASONE DIPROPIONATE 10; .64 MG/G; MG/G
CREAM TOPICAL 2 TIMES DAILY
Qty: 45 G | Refills: 0 | Status: SHIPPED | OUTPATIENT
Start: 2024-07-28

## 2024-07-28 NOTE — PATIENT INSTRUCTIONS
"Patient Education     Vaginitis in adults   The Basics   Written by the doctors and editors at [facility]   What is vaginitis? -- Vaginitis is when the vagina and vulva become red and swollen (figure 1). (The vulva is the area around the opening of the vagina.) It is sometimes called \"vulvovaginitis.\"  Many different things can cause vaginitis. These include:   Infection - There are 3 main infections that can cause vaginitis in adults. These are bacterial vaginosis, \"Candida\" or \"yeast \"infection, and trichomoniasis. Other types of infections can also cause vaginal discharge and irritation. These include some sexually transmitted infections (\"STIs\").   Skin irritation - This can be caused by soaps, bubble bath, douches, detergents, perfumes, toilet paper, or sanitary pads.   Hormone changes - For example, these can be related to puberty, pregnancy, or menopause.   Certain medicines - Examples include antibiotics or medicines that weaken the immune system.   Certain health problems - Some medical conditions increase the risk of infection. These include uncontrolled diabetes or HIV infection.  What are the symptoms of vaginitis? -- Symptoms of vaginitis include:   A change in color, odor, or amount of vaginal discharge   Itching or irritation in or around the vagina   Redness, swelling, or cracks in the skin around the vagina   Pain during sex  Should I see a doctor or nurse? -- Yes. If you have the symptoms listed above, see a doctor or nurse.  If possible, avoid using medicines that go in your vagina for a day or 2 before your appointment. If you have recently used vaginal medicine, it can be harder for the doctor or nurse to find the cause of abnormal discharge.  Will I need tests? -- Probably. First, your doctor or nurse will ask questions and do an exam. They will often do tests to check for infection.  If tests are needed, they might include:   pH and microscopy - The doctor or nurse looks at a sample of vaginal " "discharge under a microscope and checks the \"pH level.\" This is done in the doctor's office.   Lab tests - The doctor or nurse collects a sample of vaginal discharge. Then, the sample is sent to a lab for testing. These tests can show if you have an infection and, if so, what type.   STI tests - These include tests to check for gonorrhea, chlamydia, and trichomoniasis.  Some pH test kits are sold over the counter for use at home. But these are not recommended. That's because they only check the pH and cannot show the cause of infection.  How is vaginitis treated? -- Treatment depends on what is causing the vaginitis. It might include:   Medicines - Medicines are mainly used to treat vaginitis caused by infections. These include pills that you take by mouth, creams or gels that you put in your vagina, or \"suppositories.\" Suppositories are tablets that are inserted into the vagina to dissolve.   Avoiding causes of irritation - For example, if the vaginitis was caused by using bubble bath, avoiding bubble bath will help.  Can vaginitis be prevented? -- To lower your chances of getting vaginitis, you can:   Get out of wet clothing quickly - Change out of wet swimsuits or gym clothes as soon as you can. Avoid fabrics that hold moisture, like nylon or polyester.   Wear loose-fitting clothing - Tight clothing, such as pantyhose, can trap moisture and make infection more likely.   Clean the area around the vagina with water - Rinse the area with water only. If you must use soap, choose a mild soap and rinse well. Do not use bubble bath or a douche. Do not use perfume or other fragrance sprays around the vagina.   Practice good genital hygiene - Wipe from front to back after using the toilet, and urinate after you have sex.   Use condoms during sex - This can lower your chances of getting bacterial vaginosis or an STI.  What problems should I watch for? -- If you have been diagnosed with vaginitis, call for advice if:   Your " symptoms are not getting better or are getting worse after treatment.   Your vaginitis comes back after getting better.  All topics are updated as new evidence becomes available and our peer review process is complete.  This topic retrieved from [employer] on: Mar 29, 2024.  Topic 868712 Version 3.0  Release: 32.2.4 - C32.87  © 2024 UpToDate, Inc. and/or its affiliates. All rights reserved.  figure 1: Adult female external genitalia     This drawing shows the different parts of the genitals.  Graphic 56415 Version 9.0  Consumer Information Use and Disclaimer   Disclaimer: This generalized information is a limited summary of diagnosis, treatment, and/or medication information. It is not meant to be comprehensive and should be used as a tool to help the user understand and/or assess potential diagnostic and treatment options. It does NOT include all information about conditions, treatments, medications, side effects, or risks that may apply to a specific patient. It is not intended to be medical advice or a substitute for the medical advice, diagnosis, or treatment of a health care provider based on the health care provider's examination and assessment of a patient's specific and unique circumstances. Patients must speak with a health care provider for complete information about their health, medical questions, and treatment options, including any risks or benefits regarding use of medications. This information does not endorse any treatments or medications as safe, effective, or approved for treating a specific patient. UpToDate, Inc. and its affiliates disclaim any warranty or liability relating to this information or the use thereof.The use of this information is governed by the Terms of Use, available at https://www.wolterskluwer.com/en/know/clinical-effectiveness-terms. 2024© UpToDate, Inc. and its affiliates and/or licensors. All rights reserved.  Copyright   © 2024 Up[employer]Date, Inc. and/or its affiliates. All rights  reserved.

## 2024-07-28 NOTE — PROGRESS NOTES
"  [facility]        NAME: Dedra Corral is a 28 y.o. female  : 1995    MRN: [id number]  DATE: 2024  TIME: 8:43 AM    Assessment and Plan   Acute vaginitis [N76.0]  1. Acute vaginitis  clotrimazole-betamethasone (LOTRISONE) 1-0.05 % cream        Chaperone present for exam.  The skin of the inner groins does appear slightly red and irritated.  There is no obvious abscess or increased warmth of the skin.  There may be a small possible fissure/excoriation anteriorly of the mid perineum.  However, patient denies any pain with bowel movements.  Bowel movements have been normal lately.  Does have some burning in the area, especially when urine touches the surrounding skin.  There has been no abnormal vaginal discharge or bleeding per patient.  Will start on clotrimazole-betamethasone cream.  Recommended to call OB/GYN tomorrow to schedule sooner follow-up visit.    Patient Instructions     Use prescribed medication as instructed.  Tylenol/ibuprofen for pain or fever.  Make sure area is dry.  Call OB/GYN tomorrow to schedule sooner follow-up visit if possible.  Stool softener, warm sitz bath's, increasing fiber in your diet.  Follow up with PCP in 3-5 days.  Proceed to  ER if symptoms worsen.    If tests are performed, our office will contact you with results only if changes need to made to the care plan discussed with you at the visit. [facility]    Chief Complaint     Chief Complaint   Patient presents with    Vaginal Swelling     Started two days ago with vaginal and perineal swelling that is painful.  No recent trauma but did have intercourse one week ago.  No concern for STD as she uses protection. Appears to be \"bleeding\" but denies active bleeding on tissue.  Urinating without difficulty.  BM yesterday.  No pain with bowel or bladder.  Also starts it burns the area with urination.  Does have OB/GYN on           History of Present Illness " [facility]  Complex Care Plan  About Me  Patient Name:  Maddy Ortiz    YOB: 1984  Age:     34 year old   [facility] MRN:   [id number] Telephone Information:  Home Phone [phone]   Mobile [phone]       Address:    [address] [place] [id number] Email address:  No e-mail address on record      Emergency Contact(s)  Name Relationship Lgl Grd Work Phone Home Phone Mobile Phone   1. RAMY ORTIZ Mother No  [phone]    2. KASHIF ORTIZ Father No  [phone]    3. KASHIF DIAZ Friend No none [phone]           Primary language:  English     needed? No   [facility]:  [phone] op. 1  Other communication barriers: Lack of coping  Preferred Method of Communication:  Mail  Current living arrangement: I live in a private home with family(with 10 y.o dtre, Dtr currenlty staying with grandparents CPS involved.)  Mobility Status/ Medical Equipment: Independent    Health Maintenance  Health Maintenance Reviewed:      My Access Plan  Medical Emergency 911   Primary Clinic Line [facility] - [phone]   24 Hour Appointment Line [phone] or  [phone] ([phone]) (toll-free)   24 Hour Nurse Line [phone] (toll-free)   Preferred Urgent Care [facility], [phone]   Preferred Hospital [facility]  [phone]   Preferred Pharmacy [facility] [place], [place] - [address] [place] [address]     Behavioral Health Crisis Line The National Suicide Prevention Lifeline at 1-809.406.3716 or 911     My Care Team Members  Care Team       Relationship Specialty Notifications Start End    Shirley Freeman APRN CNP PCP - General Nurse Practitioner  5/24/18     Phone: [phone] Fax: [phone] [address] [facility] [address]    Shirley Freeman APRN CNP PCP - Assigned PCP   6/17/18     Phone: [phone]  "      28-year-old female presents the clinic for 2 days of vaginal and perineal swelling and pain.  Denies recent trauma.  Patient states she does use protection with intercourse, recently had sexual intercourse 1 week ago.  Patient denies difficulty urinating.  Admits to some burning in the area when she does urinate.  Normal bowel movement yesterday.  No pain with defecation.  Denies concern for STDs.  Denies vaginal discharge/bleeding.  Denies fever, chills, chest pain, shortness of breath abdominal pain, flank pain.        Review of Systems   Review of Systems   Constitutional: Negative.    Respiratory: Negative.     Cardiovascular: Negative.    Gastrointestinal: Negative.    Genitourinary:         \"Vaginal swelling\"   Neurological: Negative.          Current Medications       Current Outpatient Medications:     clotrimazole-betamethasone (LOTRISONE) 1-0.05 % cream, Apply topically 2 (two) times a day, Disp: 45 g, Rfl: 0    levonorgestrel (KYLEENA) 19.5 MG intrauterine device, 1 each by Intrauterine route (Patient not taking: Reported on 7/14/2021), Disp: , Rfl:     meloxicam (MOBIC) 15 mg tablet, Take 1 tablet (15 mg total) by mouth daily as needed (Low back pain) for up to 15 days, Disp: 15 tablet, Rfl: 0    omeprazole (PriLOSEC) 40 MG capsule, Take 1 capsule (40 mg total) by mouth daily before breakfast (Patient not taking: Reported on 7/14/2021), Disp: 30 capsule, Rfl: 1    Current Allergies     Allergies as of 07/28/2024    (No Known Allergies)            The following portions of the patient's history were reviewed and updated as appropriate: allergies, current medications, past family history, past medical history, past social history, past surgical history and problem list.     Past Medical History:   Diagnosis Date    Asthma        Past Surgical History:   Procedure Laterality Date    TONSILLECTOMY      WISDOM TOOTH EXTRACTION         Family History   Problem Relation Age of Onset    ADD / ADHD Mother  " Fax: [phone] [address] [facility] [address]    Cayla Winchester Psychologist Psychology  11/21/13     [facility]- TIMOTHY signed 11/20/13-   Email- [email or web address]    Phone: [phone] Fax: [phone]         [facility] [address] [facility] [address]    Callie Welsh [employer] worker   11/21/13     Temi PONCE    11/21/13     [facility]  (TIMOTHY signed 11/20/13)    Phone: [phone]         Wallace Calabrese MD MD Psychiatry  11/21/13     [facility] [place] (TIMOTHY signed 11/20/13)    Phone: [phone] Fax: [phone]         [facility] [address] [facility] [address]    Deni Fernandez MD MD Family Practice  5/4/18     Phone: [phone] Fax: [phone] [address] [facility] [address]    Aggie Lehman MD MD INTERNAL MEDICINE - ENDOCRINOLOGY, DIABETES & METABOLISM  5/4/18     Phone: [phone] Fax: [phone]         [address] [facility] [address] [facility] [address]    Padmini Beatty LSW Lead Care Coordinator Primary Care - [facility]  8/30/18     (Rosemary STARKS     Phone: [phone] Fax: [phone]                My Care Plans    My Care Plans  Self Management and Treatment Plan  Goals and (Comments)  Goals        General    Medication 1 (pt-stated)     Notes - Note created  8/30/2018  4:26 PM by Padmini Beatty LSW    Goal Statement: I need to get my medication.  Measure of Success: will have medication  Supportive Steps to Achieve: Verified MA is active  Barriers:   Strengths: pt called ins, will go to pharmacy to get meds  Date to Achieve By: 9/1/18  Patient expressed understanding of goal: fair        Mental Health Management (pt-stated)     Notes - Note created  8/31/2018  9:12 AM by Padmini Beatty LSW    Goal Statement: I will follow up with my therapist and Psychiatrist  Measure of Success: will keep scheduled appts  Supportive     ADD / ADHD Father     Cancer Family          Medications have been verified.        Objective   /60 (BP Location: Right arm)   Pulse 93   Temp 98.2 °F (36.8 °C) (Skin)   Resp 18   SpO2 99%        Physical Exam     Physical Exam  Exam conducted with a chaperone present.   Constitutional:       General: She is not in acute distress.     Appearance: Normal appearance. She is not ill-appearing, toxic-appearing or diaphoretic.   HENT:      Head: Normocephalic and atraumatic.   Cardiovascular:      Rate and Rhythm: Normal rate and regular rhythm.      Pulses: Normal pulses.      Heart sounds: Normal heart sounds.   Pulmonary:      Effort: Pulmonary effort is normal. No respiratory distress.      Breath sounds: Normal breath sounds.   Abdominal:      General: Abdomen is flat. Bowel sounds are normal. There is no distension.      Palpations: Abdomen is soft.      Tenderness: There is no abdominal tenderness. There is no right CVA tenderness, left CVA tenderness, guarding or rebound.      Hernia: There is no hernia in the left inguinal area or right inguinal area.   Genitourinary:     General: Normal vulva.      Pubic Area: No rash.       Labia:         Right: No rash, tenderness, lesion or injury.         Left: No rash, tenderness, lesion or injury.       Urethra: No prolapse, urethral pain, urethral swelling or urethral lesion.          Comments: Some mild erythema and irritation of the skin to the inner groin region just laterally to the labia majora.  There is a small fissure/excoriation at the mid perineum region.  Mild tenderness.  This is the area the patient complains of some burning to the skin.  No obvious fluctuance/abscess.  Labia appear normal without tenderness or abscess.  No obvious hemorrhoids.  There is no obvious vaginal swelling.  Lymphadenopathy:      Lower Body: No right inguinal adenopathy. No left inguinal adenopathy.   Skin:     General: Skin is warm and dry.      Capillary Refill:  Steps to Achieve: has appts  Barriers:   Strengths: has established supports   Date to Achieve By:Sept 15th.   Patient expressed understanding of goal: yes               Action Plans on File:         Depression  Advance Care Plans/Directives Type:        My Medical and Care Information  Problem List   Patient Active Problem List   Diagnosis     Animal dander allergy     CARDIOVASCULAR SCREENING; LDL GOAL LESS THAN 160     Psychosis (H)     Paranoid type delusional disorder (H)     Bipolar 1 disorder (H)     Insomnia     Depression with anxiety     Seasonal allergic rhinitis due to pollen     Allergic rhinitis due to mold     Allergic rhinitis due to animal dander     Allergic rhinitis due to dust mite     Encounter for IUD insertion     Schizoaffective disorder, bipolar type (H)     Gastroesophageal reflux disease without esophagitis     Paranoia (psychosis) (H)      Current Medications and Allergies:  See printed Medication Report.    Care Coordination Start Date: 8/30/2018   Frequency of Care Coordination: monthly   Form Last Updated: 12/20/2018        Capillary refill takes less than 2 seconds.   Neurological:      Mental Status: She is alert and oriented to person, place, and time.   Psychiatric:         Mood and Affect: Mood normal.